# Patient Record
Sex: MALE | Race: WHITE | NOT HISPANIC OR LATINO
[De-identification: names, ages, dates, MRNs, and addresses within clinical notes are randomized per-mention and may not be internally consistent; named-entity substitution may affect disease eponyms.]

---

## 2021-11-15 ENCOUNTER — DASHBOARD ENCOUNTERS (OUTPATIENT)
Age: 61
End: 2021-11-15

## 2021-11-15 ENCOUNTER — OFFICE VISIT (OUTPATIENT)
Dept: URBAN - METROPOLITAN AREA CLINIC 78 | Facility: CLINIC | Age: 61
End: 2021-11-15
Payer: COMMERCIAL

## 2021-11-15 VITALS
DIASTOLIC BLOOD PRESSURE: 81 MMHG | HEIGHT: 70 IN | HEART RATE: 83 BPM | RESPIRATION RATE: 16 BRPM | SYSTOLIC BLOOD PRESSURE: 136 MMHG | WEIGHT: 211.4 LBS | TEMPERATURE: 97.8 F | BODY MASS INDEX: 30.26 KG/M2

## 2021-11-15 DIAGNOSIS — K62.5 RECTAL BLEEDING: ICD-10-CM

## 2021-11-15 DIAGNOSIS — Z86.010 HISTORY OF COLON POLYPS: ICD-10-CM

## 2021-11-15 PROCEDURE — 99203 OFFICE O/P NEW LOW 30 MIN: CPT | Performed by: INTERNAL MEDICINE

## 2021-11-15 RX ORDER — SODIUM, POTASSIUM,MAG SULFATES 17.5-3.13G
177 ML DAY 1 AND 177 ML DAY 2 SOLUTION, RECONSTITUTED, ORAL ORAL
Qty: 354 MILLILITER | Refills: 0 | OUTPATIENT
Start: 2021-11-15 | End: 2021-11-17

## 2021-11-15 NOTE — HPI-TODAY'S VISIT:
Patient was referred by Dr. Humaira Wiley  A copy of this document will be sent to the physician.  Patient known to me  COlonoscopy in 2012 TA and HP polyps  Colonoscopy in 2017  , no polyps  Patient has CT scan done in 2017 for left lower quadrant pain , unremarkable except DJD changes  Patient has episode of rectal bleeding which is 3 days . BRBPR on wiping , not mixed in stools  Denies hard stools or straining   Denies Fhx of colon cancer or colon polyp Patien  gets annual screening tests through PCP , negative so far